# Patient Record
Sex: FEMALE | Race: BLACK OR AFRICAN AMERICAN | Employment: FULL TIME | URBAN - METROPOLITAN AREA
[De-identification: names, ages, dates, MRNs, and addresses within clinical notes are randomized per-mention and may not be internally consistent; named-entity substitution may affect disease eponyms.]

---

## 2018-05-22 ENCOUNTER — OFFICE VISIT (OUTPATIENT)
Dept: FAMILY MEDICINE CLINIC | Age: 43
End: 2018-05-22
Payer: COMMERCIAL

## 2018-05-22 VITALS
HEART RATE: 89 BPM | SYSTOLIC BLOOD PRESSURE: 126 MMHG | RESPIRATION RATE: 10 BRPM | BODY MASS INDEX: 30.72 KG/M2 | WEIGHT: 207.4 LBS | TEMPERATURE: 98.2 F | HEIGHT: 69 IN | DIASTOLIC BLOOD PRESSURE: 78 MMHG

## 2018-05-22 DIAGNOSIS — J34.89 SINUS PAIN: ICD-10-CM

## 2018-05-22 DIAGNOSIS — Z00.00 ENCOUNTER FOR ANNUAL HEALTH EXAMINATION: Primary | ICD-10-CM

## 2018-05-22 DIAGNOSIS — M25.551 PAIN OF RIGHT HIP JOINT: ICD-10-CM

## 2018-05-22 DIAGNOSIS — L98.9 SKIN PROBLEM: ICD-10-CM

## 2018-05-22 DIAGNOSIS — R53.83 OTHER FATIGUE: ICD-10-CM

## 2018-05-22 DIAGNOSIS — K59.01 SLOW TRANSIT CONSTIPATION: ICD-10-CM

## 2018-05-22 DIAGNOSIS — E78.6 LOW HDL (UNDER 40): ICD-10-CM

## 2018-05-22 DIAGNOSIS — R45.4 IRRITABLE: ICD-10-CM

## 2018-05-22 DIAGNOSIS — R76.8 IGG GLIADIN ANTIBODY POSITIVE: ICD-10-CM

## 2018-05-22 DIAGNOSIS — M54.2 NECK PAIN: ICD-10-CM

## 2018-05-22 DIAGNOSIS — M79.10 MUSCLE PAIN: ICD-10-CM

## 2018-05-22 DIAGNOSIS — R14.0 BLOATING: ICD-10-CM

## 2018-05-22 DIAGNOSIS — R76.8 RHEUMATOID FACTOR POSITIVE: ICD-10-CM

## 2018-05-22 PROCEDURE — 99386 PREV VISIT NEW AGE 40-64: CPT | Performed by: FAMILY MEDICINE

## 2018-05-22 RX ORDER — M-VIT,TX,IRON,MINS/CALC/FOLIC 27MG-0.4MG
1 TABLET ORAL DAILY PRN
COMMUNITY

## 2018-05-22 RX ORDER — DESLORATADINE 5 MG/1
TABLET ORAL
Refills: 3 | COMMUNITY
Start: 2018-04-28

## 2018-05-22 ASSESSMENT — PATIENT HEALTH QUESTIONNAIRE - PHQ9
1. LITTLE INTEREST OR PLEASURE IN DOING THINGS: 0
2. FEELING DOWN, DEPRESSED OR HOPELESS: 0
SUM OF ALL RESPONSES TO PHQ9 QUESTIONS 1 & 2: 0
SUM OF ALL RESPONSES TO PHQ QUESTIONS 1-9: 0

## 2018-05-22 ASSESSMENT — ENCOUNTER SYMPTOMS
CONSTIPATION: 1
EYES NEGATIVE: 1
RESPIRATORY NEGATIVE: 1
SINUS PAIN: 1
BACK PAIN: 1
ABDOMINAL DISTENTION: 1
SINUS PRESSURE: 1

## 2018-05-28 LAB
AVERAGE GLUCOSE: NORMAL
BUN BLDV-MCNC: NORMAL MG/DL
CALCIUM SERPL-MCNC: NORMAL MG/DL
CHLORIDE BLD-SCNC: NORMAL MMOL/L
CHOLESTEROL, TOTAL: 173 MG/DL
CHOLESTEROL/HDL RATIO: NORMAL
CO2: NORMAL MMOL/L
CREAT SERPL-MCNC: 0.87 MG/DL
GFR CALCULATED: NORMAL
GLUCOSE BLD-MCNC: NORMAL MG/DL
HBA1C MFR BLD: 5.8 %
HDLC SERPL-MCNC: 63 MG/DL (ref 35–70)
LDL CHOLESTEROL CALCULATED: 98 MG/DL (ref 0–160)
POTASSIUM SERPL-SCNC: 4.2 MMOL/L
SODIUM BLD-SCNC: NORMAL MMOL/L
TRIGL SERPL-MCNC: 59 MG/DL
VLDLC SERPL CALC-MCNC: 12 MG/DL

## 2018-06-20 ENCOUNTER — OFFICE VISIT (OUTPATIENT)
Dept: ORTHOPEDIC SURGERY | Age: 43
End: 2018-06-20

## 2018-06-20 VITALS
HEART RATE: 80 BPM | WEIGHT: 205.91 LBS | BODY MASS INDEX: 29.48 KG/M2 | HEIGHT: 70 IN | SYSTOLIC BLOOD PRESSURE: 129 MMHG | DIASTOLIC BLOOD PRESSURE: 82 MMHG

## 2018-06-20 DIAGNOSIS — M70.61 GREATER TROCHANTERIC BURSITIS OF RIGHT HIP: Primary | ICD-10-CM

## 2018-06-20 PROCEDURE — 99203 OFFICE O/P NEW LOW 30 MIN: CPT | Performed by: ORTHOPAEDIC SURGERY

## 2018-06-20 PROCEDURE — 20611 DRAIN/INJ JOINT/BURSA W/US: CPT | Performed by: ORTHOPAEDIC SURGERY

## 2018-06-21 PROBLEM — Z00.00 ENCOUNTER FOR ANNUAL HEALTH EXAMINATION: Status: RESOLVED | Noted: 2018-05-22 | Resolved: 2018-06-21

## 2018-06-29 ENCOUNTER — HOSPITAL ENCOUNTER (OUTPATIENT)
Dept: PHYSICAL THERAPY | Age: 43
Discharge: HOME | End: 2018-06-30
Admitting: ORTHOPAEDIC SURGERY

## 2018-06-29 NOTE — PLAN OF CARE
[facility], [facility]  [address] [place] [address]  Phone: [phone]  Fax [phone]     Physical Therapy Certification    Dear Referring Practitioner: Dr Aaron Gustafson,    We had the pleasure of evaluating the following patient for physical therapy services at [address] [place] [address]. A summary of our findings can be found in the initial assessment below. This includes our plan of care. If you have any questions or concerns regarding these findings, please do not hesitate to contact me at the office phone number checked above. Thank you for the referral.       Physician Signature:_______________________________Date:__________________  By signing above (or electronic signature), therapists plan is approved by physician    Patient: Rochelle Patient   : 1975   MRN: [id number]  Referring Physician: Referring Practitioner: Dr Aaron Gustafson      Evaluation Date: 2018      Medical Diagnosis Information:  Diagnosis: M70.61 (ICD-10-CM) - Greater trochanteric bursitis of right hip   Treatment Diagnosis: R Hip pain M25.551, R Hip Buirsitis M70.71                                         Insurance information: PT Insurance Information: [employer]     Precautions/ Contra-indications:   Latex Allergy:  [x]NO      []YES  Preferred Language for Healthcare:   [x]English       []other:    SUBJECTIVE: Patient stated complaint of R hip pain that began about 3 months ago. She thinks the hip pain may have begun after increasing her exercise routine including high intensity interval training at her gym. She did have a Cortisone injection that gave her some pain relief. Pain is reducing her ability to walk, transfer sit to stand and squatting.     Relevant Medical History: Anemia  Functional Disability Index:PT G-Codes  Functional Assessment Tool Used: LEFS  Score: 24%  Functional Limitation: Mobility: Walking and moving around  Mobility: Walking and Moving Around Current Status (): At least 20 percent but less than 40 percent impaired, limited or restricted  Mobility: Walking and Moving Around Goal Status (): At least 1 percent but less than 20 percent impaired, limited or [id number]%    Pain Scale: 3-4/10  Easing factors: Cortisone injection  Provocative factors: walking, squatting and position changes  Type: []Constant   [x]Intermittent  []Radiating []Localized []other:     Numbness/Tingling: None    Occupation/School: Professor [employer] of Function: Independent with ADLs and IADLs,     OBJECTIVE:  + [employer], + Obers Test    ROM LEFT RIGHT   HIP Flex     HIP Abd     HIP Ext     HIP IR     HIP ER     Knee ext     Knee Flex     Ankle PF     Ankle DF     Ankle In     Ankle Ev     Strength  LEFT RIGHT   HIP Flexors 5 4   HIP Abductors 5 4-   HIP Ext     Hip ER 5 4-   Knee EXT (quad) 5 5   Knee Flex (HS) 5 5   Ankle DF     Ankle PF     Ankle Inv     Ankle EV          Circumference  Mid apex  7 cm prox             Reflexes/Sensation:    []Dermatomes/Myotomes intact    [x]Reflexes equal and normal bilaterally   []Other:    Joint mobility:   [x]Normal    []Hypo   []Hyper    Palpation:  Greater trochanter, ITB, Hip Flexor  Functional Mobility/Transfers: Pain effects sit to stand transfers    Posture: R LE in ER at rest in standing    Bandages/Dressings/Incisions: None    Gait: (include devices/WB status)     Orthopedic Special Tests: + Berle Gentleman and Obers Test                       [x] Patient history, allergies, meds reviewed. Medical chart reviewed. See intake form. Review Of Systems (ROS):  [x]Performed Review of systems (Integumentary, CardioPulmonary, Neurological) by intake and observation. Intake form has been scanned into medical record. Patient has been instructed to contact their primary care physician regarding ROS issues if not already being addressed at this time.       Co-morbidities/Complexities []Excellent   [x]Good    []Fair   []Poor    Tolerance of evaluation/treatment:    []Excellent   [x]Good    []Fair   []Poor  Physical Therapy Evaluation Complexity Justification  [x] A history of present problem with:  [x] no personal factors and/or comorbidities that impact the plan of care;  []1-2 personal factors and/or comorbidities that impact the plan of care  []3 personal factors and/or comorbidities that impact the plan of care  [x] An examination of body systems using standardized tests and measures addressing any of the following: body structures and functions (impairments), activity limitations, and/or participation restrictions;:  [x] a total of 1-2 or more elements   [] a total of 3 or more elements   [] a total of 4 or more elements   [x] A clinical presentation with:  [x] stable and/or uncomplicated characteristics   [] evolving clinical presentation with changing characteristics  [] unstable and unpredictable characteristics;   [x] Clinical decision making of [x] low, [] moderate, [] high complexity using standardized patient assessment instrument and/or measurable assessment of functional outcome. [x] EVAL (LOW) 87541 (typically 20 minutes face-to-face)  [] EVAL (MOD) 65958 (typically 30 minutes face-to-face)  [] EVAL (HIGH) 64211 (typically 45 minutes face-to-face)  [] RE-EVAL       PLAN:   Frequency/Duration:  1-2 days per week for 4-6 Weeks:  Interventions:  [x]  Therapeutic exercise including: strength training, ROM, for Lower extremity and core   [x]  NMR activation and proprioception for LE, Glutes and Core   [x]  Manual therapy as indicated for LE, Hip and spine to include: Dry Needling/IASTM, STM, PROM, Gr I-IV mobilizations, manipulation. [x] Modalities as needed that may include: thermal agents, E-stim, Biofeedback, US, iontophoresis as indicated  [x] Patient education on joint protection, postural re-education, activity modification, progression of HEP.     HEP instruction: (see scanned forms)    GOALS:  Patient stated goal: increase function to previous level, painfree    Therapist goals for Patient:   Short Term Goals: To be achieved in: 2 weeks  1. Independent in HEP and progression per patient tolerance, in order to prevent re-injury. 2. Patient will have a decrease in pain to facilitate improvement in movement, function, and ADLs as indicated by Functional Deficits. Long Term Goals: To be achieved in: 4-6 weeks  1. Disability index score of 12%% or less for the LEFS to assist with reaching prior level of function. 2. Patient will demonstrate increased flexibility -Obers and Lucas Test to allow for proper joint functioning as indicated by patients Functional Deficits. 3. Patient will demonstrate an increase in Strength to at least 4+/5 good proximal hip strength and control, within 5lb HHD in LE to allow for proper functional mobility as indicated by patients Functional Deficits. 4. Patient will return to painfree transfers, walking and squatting with possible modifications functional activities without increased symptoms or restriction.    5. Increase function to previous level and painfree (patient specific functional goal)       Electronically signed by:  Janie Harper PT

## 2018-06-29 NOTE — FLOWSHEET NOTE
for normal function with self care, mobility, lifting and ambulation. Modalities:  CP R hip x 10 min    Charges:  Timed Code Treatment Minutes: 25   Total Treatment Minutes: 55     [x] EVAL (LOW) 91198 (typically 20 minutes face-to-face)  [] EVAL (MOD) 78006 (typically 30 minutes face-to-face)  [] EVAL (HIGH) 06184 (typically 45 minutes face-to-face)  [] RE-EVAL     [x] KE(15038) x  2   [] IONTO  [] NMR (40623) x      [] VASO  [] Manual (39372) x       [] Other:  [] TA x       [] Mech Traction (59767)  [] ES(attended) (66435)      [] ES (un) (63279):     GOALS:  Patient stated goal: increase function to previous level, painfree    Therapist goals for Patient:   Short Term Goals: To be achieved in: 2 weeks  1. Independent in HEP and progression per patient tolerance, in order to prevent re-injury. 2. Patient will have a decrease in pain to facilitate improvement in movement, function, and ADLs as indicated by Functional Deficits. Long Term Goals: To be achieved in: 4-6 weeks  1. Disability index score of 12%% or less for the LEFS to assist with reaching prior level of function. 2. Patient will demonstrate increased flexibility -Obers and Lucas Test to allow for proper joint functioning as indicated by patients Functional Deficits. 3. Patient will demonstrate an increase in Strength to at least 4+/5 good proximal hip strength and control, within 5lb HHD in LE to allow for proper functional mobility as indicated by patients Functional Deficits. 4. Patient will return to painfree transfers, walking and squatting with possible modifications functional activities without increased symptoms or restriction. 5. Increase function to previous level and painfree (patient specific functional goal)  Progression Towards Functional goals:  [] Patient is progressing as expected towards functional goals listed. [] Progression is slowed due to complexities listed.   [] Progression has been slowed due to co-morbidities.   [x] Plan just implemented, too soon to assess goals progression  [] Other:     ASSESSMENT:  See eval    Treatment/Activity Tolerance:  [x] Patient tolerated treatment well [] Patient limited by fatique  [] Patient limited by pain  [] Patient limited by other medical complications  [] Other:     Prognosis: [x] Good [] Fair  [] Poor    Patient Requires Follow-up: [x] Yes  [] No    PLAN: See eval  [] Continue per plan of care [] Alter current plan (see comments)  [x] Plan of care initiated [] Hold pending MD visit [] Discharge    Electronically signed by: Kristin Britt PT

## 2018-07-01 ENCOUNTER — HOSPITAL ENCOUNTER (OUTPATIENT)
Dept: PHYSICAL THERAPY | Age: 43
Discharge: HOME | End: 2018-07-01
Attending: ORTHOPAEDIC SURGERY | Admitting: ORTHOPAEDIC SURGERY

## 2018-07-06 ENCOUNTER — HOSPITAL ENCOUNTER (OUTPATIENT)
Dept: PHYSICAL THERAPY | Age: 43
Discharge: HOME | End: 2018-07-07
Admitting: ORTHOPAEDIC SURGERY

## 2018-07-06 NOTE — FLOWSHEET NOTE
Supine piriformis S (Figure 4) HEP    Supine piriformis S( crossover) HEP   Bridging 3 10 HEP   Clamshells 3 10 HEP   Kneeling Hip Flexor S HEP         Standing hip ABD glute med [place] loop 2x10 B           Incline S                  Manual Intervention      Manual HS, ITB, and modified Kat Rutherford 8'                                   NMR re-education      SLS airex 10x10\" R/L                                       Therapeutic Exercise and NMR EXR  [x] (67987) Provided verbal/tactile cueing for activities related to strengthening, flexibility, endurance, ROM for improvements in LE, proximal hip, and core control with self care, mobility, lifting, ambulation.  [] (91916) Provided verbal/tactile cueing for activities related to improving balance, coordination, kinesthetic sense, posture, motor skill, proprioception  to assist with LE, proximal hip, and core control in self care, mobility, lifting, ambulation and eccentric single leg control.      NMR and Therapeutic Activities:    [x] (16095 or 76230) Provided verbal/tactile cueing for activities related to improving balance, coordination, kinesthetic sense, posture, motor skill, proprioception and motor activation to allow for proper function of core, proximal hip and LE with self care and ADLs  [] (97344) Gait Re-education- Provided training and instruction to the patient for proper LE, core and proximal hip recruitment and positioning and eccentric body weight control with ambulation re-education including up and down stairs     Home Exercise Program:    [x] (10172) Reviewed/Progressed HEP activities related to strengthening, flexibility, endurance, ROM of core, proximal hip and LE for functional self-care, mobility, lifting and ambulation/stair navigation   [] (02318)Reviewed/Progressed HEP activities related to improving balance, coordination, kinesthetic sense, posture, motor skill, proprioception of core, proximal hip and LE for self care, mobility, lifting, and

## 2018-07-10 ENCOUNTER — HOSPITAL ENCOUNTER (OUTPATIENT)
Dept: PHYSICAL THERAPY | Age: 43
Discharge: HOME | End: 2018-07-11
Admitting: ORTHOPAEDIC SURGERY

## 2018-07-10 NOTE — FLOWSHEET NOTE
Oscillations-Mobs:  G-I, II, III, IV (PA's, Inf., Post.)  [x] (66949) Provided manual therapy to mobilize LE, proximal hip and/or LS spine soft tissue/joints for the purpose of modulating pain, promoting relaxation,  increasing ROM, reducing/eliminating soft tissue swelling/inflammation/restriction, improving soft tissue extensibility and allowing for proper ROM for normal function with self care, mobility, lifting and ambulation. Modalities:  CP R hip and toe x 10 min    Charges:  Timed Code Treatment Minutes: 40   Total Treatment Minutes: 50     [] EVAL (LOW) 98748 (typically 20 minutes face-to-face)  [] EVAL (MOD) 19201 (typically 30 minutes face-to-face)  [] EVAL (HIGH) 30216 (typically 45 minutes face-to-face)  [] RE-EVAL     [x] EX(66507) x  2   [] IONTO  [] NMR (38147) x      [] VASO  [x] Manual (67875) x  1    [] Other:  [] TA x       [] Mech Traction (09458)  [] ES(attended) (95426)      [] ES (un) (19273):     GOALS:  Patient stated goal: increase function to previous level, painfree    Therapist goals for Patient:   Short Term Goals: To be achieved in: 2 weeks  1. Independent in HEP and progression per patient tolerance, in order to prevent re-injury. 2. Patient will have a decrease in pain to facilitate improvement in movement, function, and ADLs as indicated by Functional Deficits. Long Term Goals: To be achieved in: 4-6 weeks  1. Disability index score of 12%% or less for the LEFS to assist with reaching prior level of function. 2. Patient will demonstrate increased flexibility -Obers and Lucas Test to allow for proper joint functioning as indicated by patients Functional Deficits. 3. Patient will demonstrate an increase in Strength to at least 4+/5 good proximal hip strength and control, within 5lb HHD in LE to allow for proper functional mobility as indicated by patients Functional Deficits.    4. Patient will return to painfree transfers, walking and squatting with possible modifications functional activities without increased symptoms or restriction. 5. Increase function to previous level and painfree (patient specific functional goal)  Progression Towards Functional goals:  [x] Patient is progressing as expected towards functional goals listed. [] Progression is slowed due to complexities listed. [] Progression has been slowed due to co-morbidities. [] Plan just implemented, too soon to assess goals progression  [] Other:     ASSESSMENT: Progressed standing TE with no c/o hip pain, only report of muscle fatigue and a pulling sensation. Visual and verbal cues for alignment during mini jumps and LSD. Patient tends to ER R LE in standing and Gait. Treatment/Activity Tolerance:  [x] Patient tolerated treatment well [] Patient limited by fatique  [] Patient limited by pain  [] Patient limited by other medical complications  [] Other:     Prognosis: [x] Good [] Fair  [] Poor    Patient Requires Follow-up: [x] Yes  [] No    PLAN: See in 2 weeks for possible DC to HEP if patient is still doing well.   [x] Continue per plan of care [] Alter current plan (see comments)  [] Plan of care initiated [] Hold pending MD visit [] Discharge    Electronically signed by: Rosa Thomas, PT

## 2018-07-17 ENCOUNTER — HOSPITAL ENCOUNTER (OUTPATIENT)
Dept: PHYSICAL THERAPY | Age: 43
Setting detail: THERAPIES SERIES
Discharge: HOME | End: 2018-07-17
Payer: COMMERCIAL

## 2018-07-17 PROCEDURE — 97110 THERAPEUTIC EXERCISES: CPT

## 2018-07-17 PROCEDURE — 97140 MANUAL THERAPY 1/> REGIONS: CPT

## 2018-07-17 NOTE — FLOWSHEET NOTE
Notes   Supine piriformis S (Figure 4) 30 sec 3 HEP    Supine piriformis S( crossover) 30 sec 3 HEP   Bridging 3 10 HEP   Clamshells 3 10 HEP   Kneeling Hip Flexor S HEP         Standing hip ABD glute med [place]  loop 2x10 B     LBW GOL 12 ft x 12     Incline S 30 sec x 3     LSD 4 in 3 10 Add to HEP   Mini Jumps 2 10 Cues for alignment   Manual Intervention      Manual HS, ITB, and modified Lucas S 10 min                                   NMR re-education      SLS airex 10x10\" R/L     Gliding Disc R LE stance leg Hip ABD/EXT X 10 each     BOSU Mini Squats 3 x 10  NV                         Therapeutic Exercise and NMR EXR  [x] (64513) Provided verbal/tactile cueing for activities related to strengthening, flexibility, endurance, ROM for improvements in LE, proximal hip, and core control with self care, mobility, lifting, ambulation.  [] (25543) Provided verbal/tactile cueing for activities related to improving balance, coordination, kinesthetic sense, posture, motor skill, proprioception  to assist with LE, proximal hip, and core control in self care, mobility, lifting, ambulation and eccentric single leg control.      NMR and Therapeutic Activities:    [x] (59646 or 14357) Provided verbal/tactile cueing for activities related to improving balance, coordination, kinesthetic sense, posture, motor skill, proprioception and motor activation to allow for proper function of core, proximal hip and LE with self care and ADLs  [] (55633) Gait Re-education- Provided training and instruction to the patient for proper LE, core and proximal hip recruitment and positioning and eccentric body weight control with ambulation re-education including up and down stairs     Home Exercise Program:    [x] (00722) Reviewed/Progressed HEP activities related to strengthening, flexibility, endurance, ROM of core, proximal hip and LE for functional self-care, mobility, lifting and ambulation/stair navigation   [] (36492)Reviewed/Progressed good proximal hip strength and control, within 5lb HHD in LE to allow for proper functional mobility as indicated by patients Functional Deficits. 4. Patient will return to painfree transfers, walking and squatting with possible modifications functional activities without increased symptoms or restriction. 5. Increase function to previous level and painfree (patient specific functional goal)  Progression Towards Functional goals:  [x] Patient is progressing as expected towards functional goals listed. [] Progression is slowed due to complexities listed. [] Progression has been slowed due to co-morbidities. [] Plan just implemented, too soon to assess goals progression  [] Other:     ASSESSMENT: Discussed a gradual progression to return to Elliptical. Recommended stretching before vs. Post-Cardio. Treatment/Activity Tolerance:  [x] Patient tolerated treatment well [] Patient limited by fatique  [] Patient limited by pain  [] Patient limited by other medical complications  [] Other:     Prognosis: [x] Good [] Fair  [] Poor    Patient Requires Follow-up: [x] Yes  [] No    PLAN: See in 2 weeks for possible DC to HEP if patient is still doing well.   [x] Continue per plan of care [] Alter current plan (see comments)  [] Plan of care initiated [] Hold pending MD visit [] Discharge    Electronically signed by: Abi Pompa PT

## 2018-07-31 ENCOUNTER — HOSPITAL ENCOUNTER (OUTPATIENT)
Dept: PHYSICAL THERAPY | Age: 43
Setting detail: THERAPIES SERIES
End: 2018-07-31
Payer: COMMERCIAL

## 2022-10-04 ENCOUNTER — OFFICE VISIT (OUTPATIENT)
Dept: ORTHOPEDIC SURGERY | Age: 47
End: 2022-10-04
Payer: COMMERCIAL

## 2022-10-04 VITALS — WEIGHT: 200 LBS | BODY MASS INDEX: 29.62 KG/M2 | HEIGHT: 69 IN

## 2022-10-04 DIAGNOSIS — M25.531 RIGHT WRIST PAIN: ICD-10-CM

## 2022-10-04 DIAGNOSIS — M25.532 BILATERAL WRIST PAIN: Primary | ICD-10-CM

## 2022-10-04 DIAGNOSIS — M25.531 BILATERAL WRIST PAIN: Primary | ICD-10-CM

## 2022-10-04 DIAGNOSIS — M25.532 LEFT WRIST PAIN: ICD-10-CM

## 2022-10-04 DIAGNOSIS — M25.332 SCAPHOLUNATE INSTABILITY OF LEFT WRIST: ICD-10-CM

## 2022-10-04 PROCEDURE — 99203 OFFICE O/P NEW LOW 30 MIN: CPT | Performed by: ORTHOPAEDIC SURGERY

## 2022-10-11 NOTE — PROGRESS NOTES
ORTHOPAEDIC SURGERY INITIAL EVALUATION NOTE  Chief Complaint   Patient presents with    Wrist Pain     Bilateral. Thinks maybe its from gym equipment. Began about a yr ago. Feels better if she stops going. HISTORY OF PRESENT ILLNESS:  Very pleasant 78-year-old right-hand-dominant female presents for evaluation of bilateral wrist pain. She has had insidious onset of pain for about a year. The right is more bothersome than the left. She denies any injury or trauma. She believes is related to overuse. She has been using the stairstepper. She believes it is from loading the wrists repeatedly. It does improve when she discontinues her exercise routine and gives it relative rest.  It seems to worsen when she goes to the gym more. She denies numbness or tingling. She has been managing it with OTC medications including NSAIDs, relative rest, use of her removable wrist brace, and temperature modalities. Past Medical History:   Diagnosis Date    Allergy to environmental factors     Anemia     Heart murmur        Current Outpatient Medications   Medication Sig Dispense Refill    Omega-3 Fatty Acids (FISH OIL) 1000 MG CAPS Take 3,000 mg by mouth 3 times daily      vitamin D (CHOLECALCIFEROL) 1000 UNIT TABS tablet Take 1,000 Units by mouth daily      Cinnamon 500 MG CAPS Take by mouth      desloratadine (CLARINEX) 5 MG tablet 1 tablet daily  3    Multiple Vitamins-Minerals (THERAPEUTIC MULTIVITAMIN-MINERALS) tablet Take 1 tablet by mouth daily as needed      Triamcinolone Acetonide (NASACORT ALLERGY 24HR NA) 2 sprays by Nasal route daily (Patient not taking: Reported on 10/4/2022)       No current facility-administered medications for this visit.         Past Surgical History:   Procedure Laterality Date     SECTION         Allergies   Allergen Reactions    Dust Mite Mixed Allergen Ext [Mite (D. Farinae)]     Sulfamethoxazole-Trimethoprim     Tree Extract      All trees    Yeast-Related Products Family History   Problem Relation Age of Onset    High Blood Pressure Mother     Arthritis Mother     High Blood Pressure Father     Diabetes Father         Type II    Stroke Father     Allergy (Severe) Brother     No Known Problems Brother        Social History     Socioeconomic History    Marital status:      Spouse name: Not on file    Number of children: Not on file    Years of education: Not on file    Highest education level: Not on file   Occupational History    Not on file   Tobacco Use    Smoking status: Never    Smokeless tobacco: Never   Substance and Sexual Activity    Alcohol use: Yes     Comment: occasional    Drug use: No    Sexual activity: Not on file   Other Topics Concern    Not on file   Social History Narrative    Not on file     Social Determinants of Health     Financial Resource Strain: Not on file   Food Insecurity: Not on file   Transportation Needs: Not on file   Physical Activity: Not on file   Stress: Not on file   Social Connections: Not on file   Intimate Partner Violence: Not on file   Housing Stability: Not on file     Review of Systems: Please see today's intake form for complete review of systems. PHYSICAL EXAM  Gen: awake, alert, oriented  HEENT: atraumatic, normocephalic  Neck: supple  Resp: equal chest rise bilaterally, no audible wheezes, no accessory muscle use. CV: Lower extremities warm and well perfused. Abd: soft, nontender   Focused examination of the left upper extremity:  No cuts, open wounds, or abrasions to the left wrist.  There is no obvious deformity. There is no tenderness palpation about the radial styloid or distal ulna. There is no pain with piano key test.  No tenderness about the DRUJ. There is tenderness overlying the dorsal wrist joint just distal to Kristine's tubercle.   There is a negative scaphoid Carr shift test.  There is no palpable or audible clunk during this exam.  There is pain with forced extension of the wrist experienced dorsally. No volar wrist pain. There is no significant pain with pronation/supination of the wrist.  There is 90 degrees of each. No limitation to range of motion. Sensation is intact to light touch in median, radial, ulnar, and axillary distributions. Motor function is intact to AIN, PIN, ulnar motor nerves. There is a strong palpable radial pulse, and brisk capillary refill to the fingers. Compartments are soft and compressible    Focused examination of the right upper extremity:  No cuts, open wounds, or abrasions to the right wrist.  There is no obvious deformity. There is no tenderness palpation about the radial styloid or distal ulna. There is no pain with piano key test.  No tenderness about the DRUJ. There is tenderness overlying the dorsal wrist joint just distal to Kristine's tubercle. There is a negative scaphoid Carr shift test.  There is no palpable or audible clunk during this exam.  There is pain with forced extension of the wrist experienced dorsally. No volar wrist pain. There is no significant pain with pronation/supination of the wrist.  There is 90 degrees of each. No limitation to range of motion. Sensation is intact to light touch in median, radial, ulnar, and axillary distributions. Motor function is intact to AIN, PIN, ulnar motor nerves. There is a strong palpable radial pulse, and brisk capillary refill to the fingers. Compartments are soft and compressible     RADIOGRAPHIC EXAM:  4 views of the right wrist including PA, oblique, lateral, and clenched fist image demonstrate no evidence of fracture or dislocation. Overall alignment is anatomic. There is 1 mm of ulnar positive variance. No significant degenerative changes. No soft tissue swelling. 4 views of the left wrist including PA, oblique, lateral, and clenched fist image demonstrate well-corticated ossicle adjacent to the fifth metacarpal base consistent with prior trauma.   There is 1 mm of ulnar positive

## 2023-06-29 ENCOUNTER — HOSPITAL ENCOUNTER (EMERGENCY)
Age: 48
Discharge: HOME | End: 2023-06-29
Attending: EMERGENCY MEDICINE
Payer: COMMERCIAL

## 2023-06-29 VITALS
TEMPERATURE: 99 F | HEIGHT: 69 IN | SYSTOLIC BLOOD PRESSURE: 152 MMHG | BODY MASS INDEX: 34.53 KG/M2 | DIASTOLIC BLOOD PRESSURE: 82 MMHG | HEART RATE: 86 BPM | OXYGEN SATURATION: 100 % | WEIGHT: 233.13 LBS | RESPIRATION RATE: 14 BRPM

## 2023-06-29 DIAGNOSIS — S91.212A LACERATION OF LEFT GREAT TOE WITHOUT FOREIGN BODY WITH DAMAGE TO NAIL, INITIAL ENCOUNTER: Primary | ICD-10-CM

## 2023-06-29 PROCEDURE — 12001 RPR S/N/AX/GEN/TRNK 2.5CM/<: CPT

## 2023-06-29 PROCEDURE — 90471 IMMUNIZATION ADMIN: CPT | Performed by: EMERGENCY MEDICINE

## 2023-06-29 PROCEDURE — 6360000002 HC RX W HCPCS: Performed by: EMERGENCY MEDICINE

## 2023-06-29 PROCEDURE — 90715 TDAP VACCINE 7 YRS/> IM: CPT | Performed by: EMERGENCY MEDICINE

## 2023-06-29 PROCEDURE — 99284 EMERGENCY DEPT VISIT MOD MDM: CPT

## 2023-06-29 RX ORDER — TRETINOIN 0.1 MG/G
GEL TOPICAL NIGHTLY
COMMUNITY

## 2023-06-29 RX ADMIN — TETANUS TOXOID, REDUCED DIPHTHERIA TOXOID AND ACELLULAR PERTUSSIS VACCINE, ADSORBED 0.5 ML: 5; 2.5; 8; 8; 2.5 SUSPENSION INTRAMUSCULAR at 18:17

## 2023-06-29 ASSESSMENT — ENCOUNTER SYMPTOMS
NAUSEA: 0
TROUBLE SWALLOWING: 0
SHORTNESS OF BREATH: 0
DIARRHEA: 0
VOMITING: 0
VOICE CHANGE: 0

## 2024-03-22 ENCOUNTER — APPOINTMENT (OUTPATIENT)
Dept: CT IMAGING | Age: 49
End: 2024-03-22
Payer: COMMERCIAL

## 2024-03-22 ENCOUNTER — HOSPITAL ENCOUNTER (EMERGENCY)
Age: 49
Discharge: HOME | End: 2024-03-22
Attending: EMERGENCY MEDICINE
Payer: COMMERCIAL

## 2024-03-22 VITALS
OXYGEN SATURATION: 100 % | RESPIRATION RATE: 16 BRPM | DIASTOLIC BLOOD PRESSURE: 86 MMHG | TEMPERATURE: 98.5 F | HEIGHT: 70 IN | HEART RATE: 98 BPM | BODY MASS INDEX: 32.33 KG/M2 | SYSTOLIC BLOOD PRESSURE: 158 MMHG | WEIGHT: 225.8 LBS

## 2024-03-22 DIAGNOSIS — K63.89 EPIPLOIC APPENDAGITIS: Primary | ICD-10-CM

## 2024-03-22 LAB
ALBUMIN SERPL-MCNC: 4.3 G/DL (ref 3.4–5)
ALBUMIN/GLOB SERPL: 1.2 {RATIO} (ref 1.1–2.2)
ALP SERPL-CCNC: 86 U/L (ref 40–129)
ALT SERPL-CCNC: 10 U/L (ref 10–40)
ANION GAP SERPL CALCULATED.3IONS-SCNC: 14 MMOL/L (ref 3–16)
AST SERPL-CCNC: 14 U/L (ref 15–37)
BACTERIA URNS QL MICRO: ABNORMAL /HPF
BASOPHILS # BLD: 0 K/UL (ref 0–0.2)
BASOPHILS NFR BLD: 0.7 %
BILIRUB SERPL-MCNC: 0.3 MG/DL (ref 0–1)
BILIRUB UR QL STRIP.AUTO: NEGATIVE
BUN SERPL-MCNC: 13 MG/DL (ref 7–20)
CALCIUM SERPL-MCNC: 9.2 MG/DL (ref 8.3–10.6)
CHLORIDE SERPL-SCNC: 105 MMOL/L (ref 99–110)
CLARITY UR: ABNORMAL
CO2 SERPL-SCNC: 23 MMOL/L (ref 21–32)
COLOR UR: ABNORMAL
CREAT SERPL-MCNC: 0.8 MG/DL (ref 0.6–1.1)
DEPRECATED RDW RBC AUTO: 16.6 % (ref 12.4–15.4)
EOSINOPHIL # BLD: 0.3 K/UL (ref 0–0.6)
EOSINOPHIL NFR BLD: 5.6 %
EPI CELLS #/AREA URNS HPF: ABNORMAL /HPF (ref 0–5)
GFR SERPLBLD CREATININE-BSD FMLA CKD-EPI: >60 ML/MIN/{1.73_M2}
GLUCOSE SERPL-MCNC: 108 MG/DL (ref 70–99)
GLUCOSE UR STRIP.AUTO-MCNC: NEGATIVE MG/DL
HCG UR QL: NEGATIVE
HCT VFR BLD AUTO: 32.5 % (ref 36–48)
HGB BLD-MCNC: 10.3 G/DL (ref 12–16)
HGB UR QL STRIP.AUTO: ABNORMAL
KETONES UR STRIP.AUTO-MCNC: NEGATIVE MG/DL
LEUKOCYTE ESTERASE UR QL STRIP.AUTO: ABNORMAL
LYMPHOCYTES # BLD: 1.4 K/UL (ref 1–5.1)
LYMPHOCYTES NFR BLD: 22.2 %
MCH RBC QN AUTO: 24.1 PG (ref 26–34)
MCHC RBC AUTO-ENTMCNC: 31.8 G/DL (ref 31–36)
MCV RBC AUTO: 75.8 FL (ref 80–100)
MONOCYTES # BLD: 0.5 K/UL (ref 0–1.3)
MONOCYTES NFR BLD: 8.1 %
NEUTROPHILS # BLD: 3.9 K/UL (ref 1.7–7.7)
NEUTROPHILS NFR BLD: 63.4 %
NITRITE UR QL STRIP.AUTO: NEGATIVE
PH UR STRIP.AUTO: 7 [PH] (ref 5–8)
PLATELET # BLD AUTO: 255 K/UL (ref 135–450)
PMV BLD AUTO: 9.1 FL (ref 5–10.5)
POTASSIUM SERPL-SCNC: 4.5 MMOL/L (ref 3.5–5.1)
PROT SERPL-MCNC: 7.8 G/DL (ref 6.4–8.2)
PROT UR STRIP.AUTO-MCNC: 30 MG/DL
RBC # BLD AUTO: 4.29 M/UL (ref 4–5.2)
RBC #/AREA URNS HPF: >100 /HPF (ref 0–4)
SODIUM SERPL-SCNC: 142 MMOL/L (ref 136–145)
SP GR UR STRIP.AUTO: 1.02 (ref 1–1.03)
UA COMPLETE W REFLEX CULTURE PNL UR: ABNORMAL
UA DIPSTICK W REFLEX MICRO PNL UR: YES
URN SPEC COLLECT METH UR: ABNORMAL
UROBILINOGEN UR STRIP-ACNC: 0.2 E.U./DL
WBC # BLD AUTO: 6.1 K/UL (ref 4–11)
WBC #/AREA URNS HPF: ABNORMAL /HPF (ref 0–5)

## 2024-03-22 PROCEDURE — 6360000004 HC RX CONTRAST MEDICATION: Performed by: EMERGENCY MEDICINE

## 2024-03-22 PROCEDURE — 81001 URINALYSIS AUTO W/SCOPE: CPT

## 2024-03-22 PROCEDURE — 6360000002 HC RX W HCPCS: Performed by: EMERGENCY MEDICINE

## 2024-03-22 PROCEDURE — 2580000003 HC RX 258: Performed by: EMERGENCY MEDICINE

## 2024-03-22 PROCEDURE — 85025 COMPLETE CBC W/AUTO DIFF WBC: CPT

## 2024-03-22 PROCEDURE — 99285 EMERGENCY DEPT VISIT HI MDM: CPT

## 2024-03-22 PROCEDURE — 74177 CT ABD & PELVIS W/CONTRAST: CPT

## 2024-03-22 PROCEDURE — 80053 COMPREHEN METABOLIC PANEL: CPT

## 2024-03-22 PROCEDURE — 96374 THER/PROPH/DIAG INJ IV PUSH: CPT

## 2024-03-22 PROCEDURE — 84703 CHORIONIC GONADOTROPIN ASSAY: CPT

## 2024-03-22 RX ORDER — IBUPROFEN 600 MG/1
600 TABLET ORAL 4 TIMES DAILY PRN
Qty: 40 TABLET | Refills: 0 | Status: SHIPPED | OUTPATIENT
Start: 2024-03-22

## 2024-03-22 RX ORDER — KETOROLAC TROMETHAMINE 30 MG/ML
30 INJECTION, SOLUTION INTRAMUSCULAR; INTRAVENOUS ONCE
Status: COMPLETED | OUTPATIENT
Start: 2024-03-22 | End: 2024-03-22

## 2024-03-22 RX ORDER — 0.9 % SODIUM CHLORIDE 0.9 %
1000 INTRAVENOUS SOLUTION INTRAVENOUS ONCE
Status: COMPLETED | OUTPATIENT
Start: 2024-03-22 | End: 2024-03-22

## 2024-03-22 RX ADMIN — KETOROLAC TROMETHAMINE 30 MG: 30 INJECTION, SOLUTION INTRAMUSCULAR; INTRAVENOUS at 10:43

## 2024-03-22 RX ADMIN — SODIUM CHLORIDE 1000 ML: 9 INJECTION, SOLUTION INTRAVENOUS at 10:42

## 2024-03-22 RX ADMIN — IOPAMIDOL 75 ML: 755 INJECTION, SOLUTION INTRAVENOUS at 11:23

## 2024-03-22 ASSESSMENT — PAIN DESCRIPTION - LOCATION: LOCATION: ABDOMEN

## 2024-03-22 ASSESSMENT — PAIN - FUNCTIONAL ASSESSMENT
PAIN_FUNCTIONAL_ASSESSMENT: NONE - DENIES PAIN
PAIN_FUNCTIONAL_ASSESSMENT: 0-10

## 2024-03-22 ASSESSMENT — PAIN SCALES - GENERAL: PAINLEVEL_OUTOF10: 5

## 2024-03-22 ASSESSMENT — PAIN DESCRIPTION - ORIENTATION: ORIENTATION: LEFT

## 2024-03-22 ASSESSMENT — PAIN DESCRIPTION - FREQUENCY: FREQUENCY: INTERMITTENT

## 2024-03-22 ASSESSMENT — PAIN DESCRIPTION - PAIN TYPE: TYPE: ACUTE PAIN

## 2024-03-22 NOTE — ED NOTES
Patient given discharge instructions verbal and written, patient verbalized understanding.  Alert/oriented X4, Clear speech.  Patient exhibits no distress, ambulates with steady gait per self leaving unit, no further request.

## 2024-03-22 NOTE — DISCHARGE INSTR - COC
5  Last Weight:   Wt Readings from Last 1 Encounters:   24 102.4 kg (225 lb 12.8 oz)     Mental Status:  {IP PT MENTAL STATUS:}    IV Access:  { ERICK IV ACCESS:282782090}    Nursing Mobility/ADLs:  Walking   {CHP DME ADLs:302154383}  Transfer  {CHP DME ADLs:159312431}  Bathing  {CHP DME ADLs:114018345}  Dressing  {CHP DME ADLs:363586169}  Toileting  {CHP DME ADLs:627513810}  Feeding  {CHP DME ADLs:396737309}  Med Admin  {CHP DME ADLs:039355106}  Med Delivery   { ERICK MED Delivery:906531395}    Wound Care Documentation and Therapy:        Elimination:  Continence:   Bowel: {YES / NO:}  Bladder: {YES / NO:}  Urinary Catheter: {Urinary Catheter:807116363}   Colostomy/Ileostomy/Ileal Conduit: {YES / NO:}       Date of Last BM: ***  No intake or output data in the 24 hours ending 24 1222  No intake/output data recorded.    Safety Concerns:     { ERICK Safety Concerns:760308434}    Impairments/Disabilities:      { ERICK Impairments/Disabilities:489791520}    Nutrition Therapy:  Current Nutrition Therapy:   { ERICK Diet List:417131561}    Routes of Feeding: {CHP DME Other Feedings:608692384}  Liquids: {Slp liquid thickness:50750}  Daily Fluid Restriction: {CHP DME Yes amt example:398913415}  Last Modified Barium Swallow with Video (Video Swallowing Test): {Done Not Done Date:271181259}    Treatments at the Time of Hospital Discharge:   Respiratory Treatments: ***  Oxygen Therapy:  {Therapy; copd oxygen:52949}  Ventilator:    {[facility] Vent List:500738825}    Rehab Therapies: {THERAPEUTIC INTERVENTION:1579862004}  Weight Bearing Status/Restrictions: {[facility] Weight Bearin}  Other Medical Equipment (for information only, NOT a DME order):  {EQUIPMENT:315134374}  Other Treatments: ***    Patient's personal belongings (please select all that are sent with patient):  {[facility] DME Belongings:086024159}    RN SIGNATURE:  {Esignature:621845644}    CASE MANAGEMENT/SOCIAL WORK SECTION    Inpatient

## 2024-03-22 NOTE — DISCHARGE INSTRUCTIONS
Your CT scan showed that you have something called epiploic appendagitis which is inflammation of the fat surrounding the bowel.  This is often something that resolves with the help of anti-inflammatories.  This condition should resolve shortly.  If the condition or pain progresses despite treatment please come back to the ER for repeat evaluation.

## 2024-03-22 NOTE — ED PROVIDER NOTES
adjacent to the proximal sigmoid colon, possibly acute   colitis or epiploic appendagitis.            ED BEDSIDE ULTRASOUND:   Performed by ED Physician - none    LABS:  Labs Reviewed   CBC WITH AUTO DIFFERENTIAL - Abnormal; Notable for the following components:       Result Value    Hemoglobin 10.3 (*)     Hematocrit 32.5 (*)     MCV 75.8 (*)     MCH 24.1 (*)     RDW 16.6 (*)     All other components within normal limits   COMPREHENSIVE METABOLIC PANEL W/ REFLEX TO MG FOR LOW K - Abnormal; Notable for the following components:    Glucose 108 (*)     AST 14 (*)     All other components within normal limits   URINALYSIS WITH REFLEX TO CULTURE - Abnormal; Notable for the following components:    Color, UA RED (*)     Clarity, UA CLOUDY (*)     Blood, Urine LARGE (*)     Protein, UA 30 (*)     Leukocyte Esterase, Urine TRACE (*)     All other components within normal limits   MICROSCOPIC URINALYSIS - Abnormal; Notable for the following components:    WBC, UA 6-9 (*)     RBC, UA >100 (*)     Epithelial Cells, UA 11-20 (*)     Bacteria, UA Rare (*)     All other components within normal limits   PREGNANCY, URINE       All other labs were within normal range or not returned as of this dictation.    EMERGENCY DEPARTMENT COURSE and DIFFERENTIAL DIAGNOSIS/MDM:   Vitals:    Vitals:    03/22/24 0953   BP: (!) 158/86   Pulse: 98   Resp: 16   Temp: 98.5 °F (36.9 °C)   TempSrc: Oral   SpO2: 100%   Weight: 102.4 kg (225 lb 12.8 oz)   Height: 1.778 m (5' 10\")     Patient was given the following medications:  Medications   sodium chloride 0.9 % bolus 1,000 mL (0 mLs IntraVENous Stopped 3/22/24 1213)   ketorolac (TORADOL) injection 30 mg (30 mg IntraVENous Given 3/22/24 1043)   iopamidol (ISOVUE-370) 76 % injection 75 mL (75 mLs IntraVENous Given 3/22/24 1123)         Patient was reassessed multiple times while in the emergency department patient with improvement of symptoms at time of discharge    [unfilled]      I am the